# Patient Record
Sex: FEMALE | URBAN - METROPOLITAN AREA
[De-identification: names, ages, dates, MRNs, and addresses within clinical notes are randomized per-mention and may not be internally consistent; named-entity substitution may affect disease eponyms.]

---

## 2017-03-01 ENCOUNTER — OUTPATIENT (OUTPATIENT)
Dept: OUTPATIENT SERVICES | Facility: HOSPITAL | Age: 47
LOS: 1 days | Discharge: HOME | End: 2017-03-01

## 2017-06-27 DIAGNOSIS — S99.911A UNSPECIFIED INJURY OF RIGHT ANKLE, INITIAL ENCOUNTER: ICD-10-CM

## 2024-08-23 ENCOUNTER — APPOINTMENT (OUTPATIENT)
Dept: NEUROLOGY | Facility: CLINIC | Age: 54
End: 2024-08-23
Payer: COMMERCIAL

## 2024-08-23 VITALS
SYSTOLIC BLOOD PRESSURE: 156 MMHG | HEIGHT: 67 IN | HEART RATE: 69 BPM | BODY MASS INDEX: 31.39 KG/M2 | DIASTOLIC BLOOD PRESSURE: 91 MMHG | WEIGHT: 200 LBS

## 2024-08-23 DIAGNOSIS — Z83.3 FAMILY HISTORY OF DIABETES MELLITUS: ICD-10-CM

## 2024-08-23 DIAGNOSIS — R20.2 PARESTHESIA OF SKIN: ICD-10-CM

## 2024-08-23 DIAGNOSIS — Z80.9 FAMILY HISTORY OF MALIGNANT NEOPLASM, UNSPECIFIED: ICD-10-CM

## 2024-08-23 DIAGNOSIS — Z78.9 OTHER SPECIFIED HEALTH STATUS: ICD-10-CM

## 2024-08-23 PROBLEM — Z00.00 ENCOUNTER FOR PREVENTIVE HEALTH EXAMINATION: Status: ACTIVE | Noted: 2024-08-23

## 2024-08-23 PROCEDURE — 99203 OFFICE O/P NEW LOW 30 MIN: CPT

## 2024-08-23 NOTE — ASSESSMENT
[FreeTextEntry1] : Paresthesia-etiology unclear - MRI of the brain without gadolinium to rule out central causes - EMG/NCS of the lower extremities - Blood work

## 2024-08-23 NOTE — HISTORY OF PRESENT ILLNESS
[FreeTextEntry1] : It's a pleasure to see Ms. JULIENNE EMERY In the office today. She is a 53 year -  old woman  who presents to the office today for the evaluation of paresthesia involving her right side of the face and toes in her left foot.  She reports that this started spontaneously a month ago, but has subsided significantly after he started eating more bananas.  She still has symptoms from time to time, but not as severe.  She denies any neck pain or back pain going down extremities, and denies any recent change in diet, supplements or medication.

## 2024-08-28 ENCOUNTER — APPOINTMENT (OUTPATIENT)
Dept: NEUROLOGY | Facility: CLINIC | Age: 54
End: 2024-08-28
Payer: COMMERCIAL

## 2024-08-28 PROCEDURE — 95912 NRV CNDJ TEST 11-12 STUDIES: CPT

## 2024-08-28 PROCEDURE — 95886 MUSC TEST DONE W/N TEST COMP: CPT

## 2024-09-04 ENCOUNTER — APPOINTMENT (OUTPATIENT)
Dept: MRI IMAGING | Facility: CLINIC | Age: 54
End: 2024-09-04

## 2024-09-25 ENCOUNTER — APPOINTMENT (OUTPATIENT)
Dept: NEUROLOGY | Facility: CLINIC | Age: 54
End: 2024-09-25

## 2024-10-02 ENCOUNTER — APPOINTMENT (OUTPATIENT)
Dept: MRI IMAGING | Facility: CLINIC | Age: 54
End: 2024-10-02
Payer: COMMERCIAL

## 2024-10-02 PROCEDURE — 70551 MRI BRAIN STEM W/O DYE: CPT
